# Patient Record
Sex: MALE | Race: WHITE | NOT HISPANIC OR LATINO | Employment: OTHER | URBAN - METROPOLITAN AREA
[De-identification: names, ages, dates, MRNs, and addresses within clinical notes are randomized per-mention and may not be internally consistent; named-entity substitution may affect disease eponyms.]

---

## 2020-01-31 ENCOUNTER — DOCUMENTATION ONLY (OUTPATIENT)
Dept: UROLOGY | Facility: CLINIC | Age: 75
End: 2020-01-31

## 2020-01-31 ENCOUNTER — TELEPHONE (OUTPATIENT)
Dept: UROLOGY | Facility: CLINIC | Age: 75
End: 2020-01-31

## 2020-01-31 ENCOUNTER — OFFICE VISIT (OUTPATIENT)
Dept: UROLOGY | Facility: CLINIC | Age: 75
End: 2020-01-31
Payer: MEDICARE

## 2020-01-31 VITALS
DIASTOLIC BLOOD PRESSURE: 68 MMHG | WEIGHT: 214 LBS | BODY MASS INDEX: 29.96 KG/M2 | HEIGHT: 71 IN | RESPIRATION RATE: 19 BRPM | HEART RATE: 68 BPM | SYSTOLIC BLOOD PRESSURE: 119 MMHG

## 2020-01-31 DIAGNOSIS — N52.9 ERECTILE DYSFUNCTION, UNSPECIFIED ERECTILE DYSFUNCTION TYPE: ICD-10-CM

## 2020-01-31 DIAGNOSIS — R39.15 URINARY URGENCY: ICD-10-CM

## 2020-01-31 DIAGNOSIS — N40.0 BPH WITHOUT URINARY OBSTRUCTION: Primary | ICD-10-CM

## 2020-01-31 DIAGNOSIS — R35.1 NOCTURIA: ICD-10-CM

## 2020-01-31 DIAGNOSIS — R35.0 URINARY FREQUENCY: ICD-10-CM

## 2020-01-31 LAB
POC RESIDUAL URINE VOLUME: 65 ML (ref 0–100)
PSA, DIAGNOSTIC: 0.93 NG/ML (ref 0.1–4)

## 2020-01-31 PROCEDURE — 99214 OFFICE O/P EST MOD 30 MIN: CPT | Mod: 25,S$GLB,, | Performed by: NURSE PRACTITIONER

## 2020-01-31 PROCEDURE — 51798 POCT BLADDER SCAN: ICD-10-PCS | Mod: S$GLB,,, | Performed by: NURSE PRACTITIONER

## 2020-01-31 PROCEDURE — 99214 PR OFFICE/OUTPT VISIT, EST, LEVL IV, 30-39 MIN: ICD-10-PCS | Mod: 25,S$GLB,, | Performed by: NURSE PRACTITIONER

## 2020-01-31 PROCEDURE — 51798 US URINE CAPACITY MEASURE: CPT | Mod: S$GLB,,, | Performed by: NURSE PRACTITIONER

## 2020-01-31 RX ORDER — NIACIN 1000 MG/1
TABLET, EXTENDED RELEASE ORAL
COMMUNITY
Start: 2020-01-29

## 2020-01-31 RX ORDER — CARVEDILOL 6.25 MG/1
6.25 TABLET ORAL 2 TIMES DAILY
COMMUNITY
Start: 2020-01-02

## 2020-01-31 RX ORDER — CALCIUM CITRATE/VITAMIN D3 200MG-6.25
TABLET ORAL
COMMUNITY
Start: 2019-11-23

## 2020-01-31 RX ORDER — SIMVASTATIN 40 MG/1
40 TABLET, FILM COATED ORAL DAILY
COMMUNITY
Start: 2019-11-04

## 2020-01-31 RX ORDER — PEN NEEDLE, DIABETIC 30 GX3/16"
NEEDLE, DISPOSABLE MISCELLANEOUS
COMMUNITY
Start: 2020-01-02

## 2020-01-31 RX ORDER — AMLODIPINE BESYLATE 5 MG/1
5 TABLET ORAL EVERY MORNING
COMMUNITY
Start: 2019-11-01

## 2020-01-31 RX ORDER — FUROSEMIDE 40 MG/1
20 TABLET ORAL
COMMUNITY
Start: 2020-01-20

## 2020-01-31 RX ORDER — POLYETHYLENE GLYCOL-3350 AND ELECTROLYTES 236; 6.74; 5.86; 2.97; 22.74 G/274.31G; G/274.31G; G/274.31G; G/274.31G; G/274.31G
POWDER, FOR SOLUTION ORAL
COMMUNITY
Start: 2020-01-28

## 2020-01-31 RX ORDER — RIVAROXABAN 20 MG/1
TABLET, FILM COATED ORAL
COMMUNITY
Start: 2020-01-21

## 2020-01-31 RX ORDER — LISINOPRIL 20 MG/1
20 TABLET ORAL 2 TIMES DAILY
COMMUNITY
Start: 2019-11-14

## 2020-01-31 RX ORDER — INSULIN GLARGINE AND LIXISENATIDE 100; 33 U/ML; UG/ML
INJECTION, SOLUTION SUBCUTANEOUS
COMMUNITY
Start: 2020-01-23

## 2020-01-31 NOTE — PROGRESS NOTES
"Subjective:       Patient ID: Rajendra Paiz is a 74 y.o. male.    Chief Complaint: Other (Yearly f/u)      HPI: 74-year-old male, patient Dr. Aiken, presents for yearly re-evaluation.  Patient has a history BPH without obstruction.  Patient denies any pain or burning urination.  States he has a good stream.  Denies difficulty voiding.  Patient does complain of urinary frequency, urgency, and nocturia.  However, patient is on Lasix in the morning and patient also admits he drinks a lot a water up until bedtime.    Patient has a history of erectile dysfunction.  Patient had been on oral medications in the past, however they stopped working.  Patient also was on Tri Mix injections in the past which in no longer work either.  Patient has been provided information on penile prosthesis, but at this time patient has opted not to have a prosthesis.    No other urinary complaints.  All other health problems are stable at this time.       Past Medical History:   Past Medical History:   Diagnosis Date    Diabetes mellitus     Elevated PSA     Hypercholesterolemia     Hyperlipidemia     Hypertension     Kidney stone        Past Surgical Historical:   Past Surgical History:   Procedure Laterality Date    arm surgery      BACK SURGERY      KIDNEY STONE SURGERY      trigger finger surgey      VARICOSE VEIN SURGERY      wirst surgery          Medications:   Medication List with Changes/Refills   Current Medications    AMLODIPINE (NORVASC) 5 MG TABLET    Take 5 mg by mouth every morning.    CARVEDILOL (COREG) 6.25 MG TABLET    Take 6.25 mg by mouth 2 (two) times daily.    FUROSEMIDE (LASIX) 40 MG TABLET        GAVILYTE-G 236-22.74-6.74 -5.86 GRAM SUSPENSION        LISINOPRIL (PRINIVIL,ZESTRIL) 20 MG TABLET    Take 20 mg by mouth 2 (two) times daily.    NIACIN (NIASPAN) 1000 MG CR TABLET        PEN NEEDLE, DIABETIC 32 GAUGE X 5/32" NDLE    USE 1 PEN NEEDLE ONCE DAILY    SIMVASTATIN (ZOCOR) 40 MG TABLET    Take 40 mg by " mouth once daily.    SOLIQUA 100/33 100 UNIT-33 MCG/ML INPN        TRUE METRIX GLUCOSE TEST STRIP STRP    CHECK BLOOD SUGAR ONCE DAILY    XARELTO 20 MG TAB            Past Social History:   Social History     Socioeconomic History    Marital status:      Spouse name: Not on file    Number of children: Not on file    Years of education: Not on file    Highest education level: Not on file   Occupational History    Not on file   Social Needs    Financial resource strain: Not on file    Food insecurity:     Worry: Not on file     Inability: Not on file    Transportation needs:     Medical: Not on file     Non-medical: Not on file   Tobacco Use    Smoking status: Never Smoker    Smokeless tobacco: Never Used   Substance and Sexual Activity    Alcohol use: Never     Frequency: Never    Drug use: Never    Sexual activity: Not on file   Lifestyle    Physical activity:     Days per week: Not on file     Minutes per session: Not on file    Stress: Not on file   Relationships    Social connections:     Talks on phone: Not on file     Gets together: Not on file     Attends Anabaptist service: Not on file     Active member of club or organization: Not on file     Attends meetings of clubs or organizations: Not on file     Relationship status: Not on file   Other Topics Concern    Not on file   Social History Narrative    Not on file       Allergies: Review of patient's allergies indicates:  No Known Allergies     Family History: History reviewed. No pertinent family history.     Review of Systems:  Review of Systems   Constitutional: Negative for activity change and appetite change.   HENT: Negative for congestion and dental problem.    Eyes: Negative for visual disturbance.   Respiratory: Negative for chest tightness and shortness of breath.    Cardiovascular: Negative for chest pain.   Gastrointestinal: Negative for abdominal distention and abdominal pain.   Genitourinary: Positive for frequency and  urgency. Negative for decreased urine volume, difficulty urinating, discharge, dysuria, enuresis, flank pain, genital sores, hematuria, penile pain, penile swelling, scrotal swelling and testicular pain.   Musculoskeletal: Negative for back pain and neck pain.   Skin: Negative for color change.   Neurological: Negative for dizziness.   Hematological: Negative for adenopathy.   Psychiatric/Behavioral: Negative for agitation, behavioral problems and confusion.       Physical Exam:  Physical Exam   Nursing note and vitals reviewed.  Constitutional: He is oriented to person, place, and time. He appears well-developed and well-nourished.   HENT:   Head: Normocephalic.   Eyes: Pupils are equal, round, and reactive to light.   Neck: Normal range of motion. Neck supple.   Cardiovascular: Normal rate, regular rhythm and normal heart sounds.    Pulmonary/Chest: Effort normal and breath sounds normal.   Abdominal: Soft. Bowel sounds are normal.   Genitourinary: Rectum normal and prostate normal.   Musculoskeletal: Normal range of motion.   Neurological: He is alert and oriented to person, place, and time.   Skin: Skin is warm and dry.     Psychiatric: He has a normal mood and affect. His behavior is normal.     Bladder scan:  65 cc.    Assessment/Plan:   1.  BPH without obstruction:  Will check the patient's PSA.  We will notify him of the results.    2.  Erectile dysfunction:  We discussed penile prosthesis again.  Patient still has considered it, however at this time still does not want to proceed.  Patient instructed to notify us if he would like to proceed with a penile prosthesis.    3.  Urinary frequency/urgency/nocturia:  Patient is frequency and urgency that is primarily.  In the morning after taking his fluid pill.  We discussed the association and patient stated understanding.  We discussed nightly fluids, and and affects of tea, soda, caffeine, and he salt/sodium intake.  Patient stated understanding.    Patient  will follow up in 1 year, sooner if needed.  Problem List Items Addressed This Visit     None      Visit Diagnoses     BPH without urinary obstruction    -  Primary    Relevant Orders    Prostate Specific Antigen, Diagnostic    POCT Bladder Scan    Erectile dysfunction, unspecified erectile dysfunction type        Urinary urgency        Urinary frequency        Nocturia

## 2021-04-19 ENCOUNTER — OFFICE VISIT (OUTPATIENT)
Dept: UROLOGY | Facility: CLINIC | Age: 76
End: 2021-04-19
Payer: MEDICARE

## 2021-04-19 VITALS — WEIGHT: 214 LBS | RESPIRATION RATE: 18 BRPM | BODY MASS INDEX: 30.64 KG/M2 | HEIGHT: 70 IN

## 2021-04-19 DIAGNOSIS — N40.0 BPH WITHOUT URINARY OBSTRUCTION: Primary | ICD-10-CM

## 2021-04-19 LAB — PSA, DIAGNOSTIC: 0.82 NG/ML (ref 0–4)

## 2021-04-19 PROCEDURE — 51798 US URINE CAPACITY MEASURE: CPT | Mod: S$GLB,,, | Performed by: NURSE PRACTITIONER

## 2021-04-19 PROCEDURE — 51798 POCT BLADDER SCAN: ICD-10-PCS | Mod: S$GLB,,, | Performed by: NURSE PRACTITIONER

## 2021-04-19 PROCEDURE — 99214 PR OFFICE/OUTPT VISIT, EST, LEVL IV, 30-39 MIN: ICD-10-PCS | Mod: S$GLB,,, | Performed by: NURSE PRACTITIONER

## 2021-04-19 PROCEDURE — 99214 OFFICE O/P EST MOD 30 MIN: CPT | Mod: S$GLB,,, | Performed by: NURSE PRACTITIONER

## 2021-04-19 RX ORDER — TADALAFIL 20 MG/1
20 TABLET ORAL DAILY
Qty: 5 TABLET | Refills: 11 | Status: SHIPPED | OUTPATIENT
Start: 2021-04-19 | End: 2022-03-15

## 2021-04-19 RX ORDER — ASPIRIN 81 MG/1
81 TABLET ORAL DAILY
COMMUNITY

## 2021-04-20 ENCOUNTER — TELEPHONE (OUTPATIENT)
Dept: UROLOGY | Facility: CLINIC | Age: 76
End: 2021-04-20

## 2021-04-20 LAB — POC RESIDUAL URINE VOLUME: 0 ML (ref 0–100)

## 2022-04-19 ENCOUNTER — TELEPHONE (OUTPATIENT)
Dept: UROLOGY | Facility: CLINIC | Age: 77
End: 2022-04-19

## 2022-04-19 ENCOUNTER — OFFICE VISIT (OUTPATIENT)
Dept: UROLOGY | Facility: CLINIC | Age: 77
End: 2022-04-19
Payer: MEDICARE

## 2022-04-19 VITALS
BODY MASS INDEX: 29.78 KG/M2 | WEIGHT: 208 LBS | HEART RATE: 59 BPM | DIASTOLIC BLOOD PRESSURE: 75 MMHG | HEIGHT: 70 IN | SYSTOLIC BLOOD PRESSURE: 149 MMHG

## 2022-04-19 DIAGNOSIS — N40.0 BPH WITHOUT URINARY OBSTRUCTION: Primary | ICD-10-CM

## 2022-04-19 DIAGNOSIS — N52.9 ERECTILE DYSFUNCTION, UNSPECIFIED ERECTILE DYSFUNCTION TYPE: ICD-10-CM

## 2022-04-19 LAB — PSA, DIAGNOSTIC: 0.68 NG/ML (ref 0–4)

## 2022-04-19 PROCEDURE — 99214 PR OFFICE/OUTPT VISIT, EST, LEVL IV, 30-39 MIN: ICD-10-PCS | Mod: S$GLB,,, | Performed by: NURSE PRACTITIONER

## 2022-04-19 PROCEDURE — 99214 OFFICE O/P EST MOD 30 MIN: CPT | Mod: S$GLB,,, | Performed by: NURSE PRACTITIONER

## 2022-04-19 RX ORDER — ALBUTEROL SULFATE 0.83 MG/ML
SOLUTION RESPIRATORY (INHALATION)
COMMUNITY
Start: 2022-04-14

## 2022-04-19 RX ORDER — FERROUS GLUCONATE 324(38)MG
1 TABLET ORAL 2 TIMES DAILY
COMMUNITY
Start: 2022-03-26

## 2022-04-19 RX ORDER — TADALAFIL 20 MG/1
20 TABLET ORAL DAILY
Qty: 5 TABLET | Refills: 11 | Status: SHIPPED | OUTPATIENT
Start: 2022-04-19 | End: 2022-11-23

## 2022-04-19 RX ORDER — LORATADINE 10 MG/1
10 TABLET ORAL DAILY
COMMUNITY

## 2022-04-19 RX ORDER — METFORMIN HYDROCHLORIDE 500 MG/1
500 TABLET ORAL 3 TIMES DAILY
COMMUNITY
Start: 2022-03-29

## 2022-04-19 NOTE — TELEPHONE ENCOUNTER
Patient notified of results. [facility] LPN    ----- Message from Tian Heller NP sent at 4/19/2022  1:18 PM CDT -----  The PSA is good

## 2022-04-19 NOTE — PROGRESS NOTES
Subjective:       Patient ID: Rajendra Paiz is a 77 y.o. male.    Chief Complaint: Benign Prostatic Hypertrophy (Yrly/denies any urinary issues)      HPI: 77-year-old male, former patient Dr. Aiken, presents for yearly visit.  Patient has history BPH without obstruction.  Patient states he is doing well.  He denies any pain or burning urination.  States he has a pretty good stream from start to finish.  Denies having strain to void.  Denies any significant frequency urgency.  Does have occasional nocturia.  Denies any odor urine.  Denies any fever.  Denies any body aches.  Denies any blood in urine.  Denies any significant weight loss.    Patient has a history of erectile dysfunction.  Patient is currently on generic Cialis 20 mg as needed.  Patient states this works well for him for the most part.  He is requesting refills.    No other urinary complaints at this time.       Past Medical History:   Past Medical History:   Diagnosis Date    A-fib     Allergy     BPH without urinary obstruction     Diabetes mellitus     Hypercholesterolemia     Hyperlipidemia     Hypertension     Kidney stone        Past Surgical Historical:   Past Surgical History:   Procedure Laterality Date    arm surgery      BACK SURGERY      KIDNEY STONE SURGERY      trigger finger surgey      VARICOSE VEIN SURGERY      wirst surgery          Medications:   Medication List with Changes/Refills   Current Medications    ALBUTEROL (PROVENTIL) 2.5 MG /3 ML (0.083 %) NEBULIZER SOLUTION    USE 1 VIAL IN NEBULIZER 4 TIMES DAILY FOR WHEEZING FOR SHORTNESS OF BREATH    AMLODIPINE (NORVASC) 5 MG TABLET    Take 5 mg by mouth every morning.    ASPIRIN (ECOTRIN) 81 MG EC TABLET    Take 81 mg by mouth once daily.    CARVEDILOL (COREG) 6.25 MG TABLET    Take 6.25 mg by mouth 2 (two) times daily.    FERROUS GLUCONATE (FERGON) 324 MG TABLET    Take 1 tablet by mouth 2 (two) times daily.    FUROSEMIDE (LASIX) 40 MG TABLET    20 mg.     GAVILYTE-G  "236-22.74-6.74 -5.86 GRAM SUSPENSION        LISINOPRIL (PRINIVIL,ZESTRIL) 20 MG TABLET    Take 20 mg by mouth 2 (two) times daily.    LORATADINE (CLARITIN) 10 MG TABLET    Take 10 mg by mouth once daily.    METFORMIN (GLUCOPHAGE) 500 MG TABLET    Take 500 mg by mouth 3 (three) times daily.    NIACIN (NIASPAN) 1000 MG CR TABLET        PEN NEEDLE, DIABETIC 32 GAUGE X 5/32" NDLE    USE 1 PEN NEEDLE ONCE DAILY    SIMVASTATIN (ZOCOR) 40 MG TABLET    Take 40 mg by mouth once daily.    SOLIQUA 100/33 100 UNIT-33 MCG/ML INPN        TRUE METRIX GLUCOSE TEST STRIP STRP    CHECK BLOOD SUGAR ONCE DAILY    XARELTO 20 MG TAB       Changed and/or Refilled Medications    Modified Medication Previous Medication    TADALAFIL (CIALIS) 20 MG TAB tadalafiL (CIALIS) 20 MG Tab       Take 1 tablet (20 mg total) by mouth once daily.    Take 1 tablet by mouth once daily        Past Social History:   Social History     Socioeconomic History    Marital status:    Tobacco Use    Smoking status: Never Smoker    Smokeless tobacco: Never Used   Substance and Sexual Activity    Alcohol use: Never    Drug use: Never       Allergies:   Review of patient's allergies indicates:   Allergen Reactions    Procaine Other (See Comments)     Novocaine: hiccups severely          Family History:   Family History   Problem Relation Age of Onset    No Known Problems Father     No Known Problems Mother         Review of Systems:  Review of Systems   Constitutional: Negative for activity change and appetite change.   HENT: Negative for congestion and dental problem.    Eyes: Negative for visual disturbance.   Respiratory: Negative for chest tightness and shortness of breath.    Cardiovascular: Negative for chest pain.   Gastrointestinal: Negative for abdominal distention and abdominal pain.   Genitourinary: Negative for decreased urine volume, difficulty urinating, dysuria, enuresis, flank pain, frequency, genital sores, hematuria, penile discharge, " penile pain, penile swelling, scrotal swelling, testicular pain and urgency.   Musculoskeletal: Negative for back pain and neck pain.   Skin: Negative for color change.   Neurological: Negative for dizziness.   Hematological: Negative for adenopathy.   Psychiatric/Behavioral: Negative for agitation, behavioral problems and confusion.       Physical Exam:  Physical Exam  Vitals and nursing note reviewed.   Constitutional:       Appearance: He is well-developed.   HENT:      Head: Normocephalic.   Eyes:      Pupils: Pupils are equal, round, and reactive to light.   Cardiovascular:      Rate and Rhythm: Normal rate and regular rhythm.      Heart sounds: Normal heart sounds.   Pulmonary:      Effort: Pulmonary effort is normal.      Breath sounds: Normal breath sounds.   Abdominal:      General: Bowel sounds are normal.      Palpations: Abdomen is soft.   Genitourinary:     Penis: Normal.       Prostate: Normal.      Rectum: Normal.      Comments: Prostate exam is benign.  Prostate smooth with no nodules and nontender.  Prostate is symmetrical.  Musculoskeletal:         General: Normal range of motion.      Cervical back: Normal range of motion and neck supple.   Skin:     General: Skin is warm and dry.   Neurological:      Mental Status: He is alert and oriented to person, place, and time.   Psychiatric:         Behavior: Behavior normal.       Urinalysis:  Normal  Bladder scan:  79 cc    Assessment/Plan:   1. BPH without obstruction:  Check the patient's PSA.  We will notify him of the results via the ale.    2. Erectile dysfunction:  Patient request refill generic Cialis 20 mg.  Patient provided refill.    Will plan follow-up in 1 year, sooner if needed.  Problem List Items Addressed This Visit    None     Visit Diagnoses     BPH without urinary obstruction    -  Primary    Relevant Orders    Prostate Specific Antigen, Diagnostic    POCT Urinalysis (w/Micro Option)    Erectile dysfunction, unspecified erectile  dysfunction type        Relevant Medications    tadalafiL (CIALIS) 20 MG Tab

## 2023-04-19 ENCOUNTER — TELEPHONE (OUTPATIENT)
Dept: UROLOGY | Facility: CLINIC | Age: 78
End: 2023-04-19

## 2023-04-19 ENCOUNTER — OFFICE VISIT (OUTPATIENT)
Dept: UROLOGY | Facility: CLINIC | Age: 78
End: 2023-04-19
Payer: MEDICARE

## 2023-04-19 VITALS — HEART RATE: 85 BPM | DIASTOLIC BLOOD PRESSURE: 75 MMHG | SYSTOLIC BLOOD PRESSURE: 148 MMHG

## 2023-04-19 DIAGNOSIS — N52.9 ERECTILE DYSFUNCTION, UNSPECIFIED ERECTILE DYSFUNCTION TYPE: ICD-10-CM

## 2023-04-19 DIAGNOSIS — N40.0 BPH WITHOUT URINARY OBSTRUCTION: Primary | ICD-10-CM

## 2023-04-19 LAB — PSA, DIAGNOSTIC: 0.77 NG/ML (ref 0–4)

## 2023-04-19 PROCEDURE — 99214 OFFICE O/P EST MOD 30 MIN: CPT | Mod: S$GLB,,, | Performed by: NURSE PRACTITIONER

## 2023-04-19 PROCEDURE — 99214 PR OFFICE/OUTPT VISIT, EST, LEVL IV, 30-39 MIN: ICD-10-PCS | Mod: S$GLB,,, | Performed by: NURSE PRACTITIONER

## 2023-04-19 NOTE — PROGRESS NOTES
Subjective:       Patient ID: Rajendra Paiz is a 78 y.o. male.    Chief Complaint: Benign Prostatic Hypertrophy      HPI: 78-YEAR-OLD MALE, ESTABLISHED PATIENT, PRESENTS FOR YEARLY VISIT.    PATIENT HAS HISTORY OF BPH WITHOUT OBSTRUCTION.    Patient is not on any medication it is.    States he has a good stream.  Denies any pain or burning urination.  Denies having strain to void.  Denies any significant frequency, urgency, or nocturia.    Patient has history of erectile dysfunction.  We have the patient on tadalafil 20 mg as needed.    Patient states he is not on that medication right now.  States his cardiologist put him on medication and he was told not to take the tadalafil.      No other urinary complaints at this time.       Past Medical History:   Past Medical History:   Diagnosis Date    A-fib     Allergy     BPH without urinary obstruction     Diabetes mellitus     Hypercholesterolemia     Hyperlipidemia     Hypertension     Kidney stone        Past Surgical Historical:   Past Surgical History:   Procedure Laterality Date    arm surgery      BACK SURGERY      KIDNEY STONE SURGERY      trigger finger surgey      VARICOSE VEIN SURGERY      wirst surgery          Medications:   Medication List with Changes/Refills   Current Medications    ALBUTEROL (PROVENTIL) 2.5 MG /3 ML (0.083 %) NEBULIZER SOLUTION    USE 1 VIAL IN NEBULIZER 4 TIMES DAILY FOR WHEEZING FOR SHORTNESS OF BREATH    AMLODIPINE (NORVASC) 5 MG TABLET    Take 5 mg by mouth every morning.    ASPIRIN (ECOTRIN) 81 MG EC TABLET    Take 81 mg by mouth once daily.    CARVEDILOL (COREG) 6.25 MG TABLET    Take 6.25 mg by mouth 2 (two) times daily.    FERROUS GLUCONATE (FERGON) 324 MG TABLET    Take 1 tablet by mouth 2 (two) times daily.    FUROSEMIDE (LASIX) 40 MG TABLET    20 mg.     GAVILYTE-G 236-22.74-6.74 -5.86 GRAM SUSPENSION        LISINOPRIL (PRINIVIL,ZESTRIL) 20 MG TABLET    Take 20 mg by mouth 2 (two) times daily.    LORATADINE (CLARITIN) 10 MG  "TABLET    Take 10 mg by mouth once daily.    METFORMIN (GLUCOPHAGE) 500 MG TABLET    Take 500 mg by mouth 3 (three) times daily.    NIACIN (NIASPAN) 1000 MG CR TABLET        PEN NEEDLE, DIABETIC 32 GAUGE X 5/32" NDLE    USE 1 PEN NEEDLE ONCE DAILY    SIMVASTATIN (ZOCOR) 40 MG TABLET    Take 40 mg by mouth once daily.    SOLIQUA 100/33 100 UNIT-33 MCG/ML INPN        TADALAFIL (CIALIS) 20 MG TAB    Take 1 tablet (20 mg total) by mouth daily as needed (na).    TRUE METRIX GLUCOSE TEST STRIP STRP    CHECK BLOOD SUGAR ONCE DAILY    XARELTO 20 MG TAB            Past Social History:   Social History     Socioeconomic History    Marital status:    Tobacco Use    Smoking status: Never    Smokeless tobacco: Never   Substance and Sexual Activity    Alcohol use: Never    Drug use: Never       Allergies:   Review of patient's allergies indicates:   Allergen Reactions    Procaine Other (See Comments)     Novocaine: hiccups severely          Family History:   Family History   Problem Relation Age of Onset    No Known Problems Father     No Known Problems Mother         Review of Systems:  Review of Systems   Constitutional:  Negative for activity change and appetite change.   HENT:  Negative for congestion and dental problem.    Eyes:  Negative for visual disturbance.   Respiratory:  Negative for chest tightness and shortness of breath.    Cardiovascular:  Negative for chest pain.   Gastrointestinal:  Negative for abdominal distention and abdominal pain.   Genitourinary:  Negative for decreased urine volume, difficulty urinating, dysuria, enuresis, flank pain, frequency, genital sores, hematuria, penile discharge, penile pain, penile swelling, scrotal swelling, testicular pain and urgency.   Musculoskeletal:  Negative for back pain and neck pain.   Skin:  Negative for color change.   Neurological:  Negative for dizziness.   Hematological:  Negative for adenopathy.   Psychiatric/Behavioral:  Negative for agitation, " behavioral problems and confusion.      Physical Exam:  Physical Exam  Vitals and nursing note reviewed.   Constitutional:       Appearance: He is well-developed.   HENT:      Head: Normocephalic.   Eyes:      Pupils: Pupils are equal, round, and reactive to light.   Cardiovascular:      Rate and Rhythm: Normal rate and regular rhythm.      Heart sounds: Normal heart sounds.   Pulmonary:      Effort: Pulmonary effort is normal.      Breath sounds: Normal breath sounds.   Abdominal:      General: Bowel sounds are normal.      Palpations: Abdomen is soft.   Genitourinary:     Penis: Normal.       Prostate: Not enlarged (Prostate exam is benign.  Prostate smooth no nodules and nontender.  Prostate is symmetrical).      Rectum: Normal.   Musculoskeletal:         General: Normal range of motion.      Cervical back: Normal range of motion and neck supple.   Skin:     General: Skin is warm and dry.   Neurological:      Mental Status: He is alert and oriented to person, place, and time.   Psychiatric:         Behavior: Behavior normal.     Bladder scan:  15 cc    Assessment/Plan:   1. BPH without obstruction:  Check patient's PSA.  We will notify him of the results.      2. Erectile dysfunction:  Patient stop tadalafil per instructions from cardiologist.      Plan follow-up in 1 year, sooner if needed  Problem List Items Addressed This Visit    None  Visit Diagnoses       BPH without urinary obstruction    -  Primary    Relevant Orders    Prostate Specific Antigen, Diagnostic    POCT Bladder Scan    Erectile dysfunction, unspecified erectile dysfunction type

## 2024-04-22 ENCOUNTER — OFFICE VISIT (OUTPATIENT)
Dept: UROLOGY | Facility: CLINIC | Age: 79
End: 2024-04-22
Payer: MEDICARE

## 2024-04-22 VITALS
HEART RATE: 78 BPM | HEIGHT: 70 IN | OXYGEN SATURATION: 97 % | SYSTOLIC BLOOD PRESSURE: 138 MMHG | WEIGHT: 200 LBS | BODY MASS INDEX: 28.63 KG/M2 | DIASTOLIC BLOOD PRESSURE: 76 MMHG

## 2024-04-22 DIAGNOSIS — N40.0 BPH WITHOUT URINARY OBSTRUCTION: Primary | ICD-10-CM

## 2024-04-22 LAB — PSA, DIAGNOSTIC: 0.75 NG/ML (ref 0.1–4)

## 2024-04-22 PROCEDURE — 99213 OFFICE O/P EST LOW 20 MIN: CPT | Mod: S$GLB,,, | Performed by: NURSE PRACTITIONER

## 2024-04-22 PROCEDURE — 36415 COLL VENOUS BLD VENIPUNCTURE: CPT | Mod: S$GLB,,, | Performed by: NURSE PRACTITIONER

## 2024-04-22 NOTE — PROGRESS NOTES
"Subjective:       Patient ID: Rajendra Paiz is a 79 y.o. male.    Chief Complaint: Benign Prostatic Hypertrophy (Without urinary obstruction)      HPI: 79-year-old male, established patient, presents for yearly visit.    Patient has history BPH without obstruction.    .  Denies any pain or burning urination.  Denies any difficulty voiding.  States he has a good stream from start to finish.  Denies any significant frequency, urgency, or nocturia.  No other urinary complaints this time.         Past Medical History:   Past Medical History:   Diagnosis Date    A-fib     Allergy     BPH without urinary obstruction     Diabetes mellitus     Hypercholesterolemia     Hyperlipidemia     Hypertension     Kidney stone        Past Surgical Historical:   Past Surgical History:   Procedure Laterality Date    arm surgery      BACK SURGERY      KIDNEY STONE SURGERY      trigger finger surgey      VARICOSE VEIN SURGERY      wirst surgery          Medications:   Medication List with Changes/Refills   Current Medications    ALBUTEROL (PROVENTIL) 2.5 MG /3 ML (0.083 %) NEBULIZER SOLUTION    USE 1 VIAL IN NEBULIZER 4 TIMES DAILY FOR WHEEZING FOR SHORTNESS OF BREATH    AMLODIPINE (NORVASC) 5 MG TABLET    Take 5 mg by mouth every morning.    ASPIRIN (ECOTRIN) 81 MG EC TABLET    Take 81 mg by mouth once daily.    CARVEDILOL (COREG) 6.25 MG TABLET    Take 6.25 mg by mouth 2 (two) times daily.    FERROUS GLUCONATE (FERGON) 324 MG TABLET    Take 1 tablet by mouth 2 (two) times daily.    FUROSEMIDE (LASIX) 40 MG TABLET    20 mg.     GAVILYTE-G 236-22.74-6.74 -5.86 GRAM SUSPENSION        LISINOPRIL (PRINIVIL,ZESTRIL) 20 MG TABLET    Take 20 mg by mouth 2 (two) times daily.    LORATADINE (CLARITIN) 10 MG TABLET    Take 10 mg by mouth once daily.    METFORMIN (GLUCOPHAGE) 500 MG TABLET    Take 500 mg by mouth 3 (three) times daily.    NIACIN (NIASPAN) 1000 MG CR TABLET        PEN NEEDLE, DIABETIC 32 GAUGE X 5/32" NDLE    USE 1 PEN NEEDLE ONCE " DAILY    SIMVASTATIN (ZOCOR) 40 MG TABLET    Take 40 mg by mouth once daily.    SOLIQUA 100/33 100 UNIT-33 MCG/ML INPN        TADALAFIL (CIALIS) 20 MG TAB    Take 1 tablet (20 mg total) by mouth daily as needed (na).    TRUE METRIX GLUCOSE TEST STRIP STRP    CHECK BLOOD SUGAR ONCE DAILY    XARELTO 20 MG TAB            Past Social History:   Social History     Socioeconomic History    Marital status:    Tobacco Use    Smoking status: Never    Smokeless tobacco: Never   Substance and Sexual Activity    Alcohol use: Never    Drug use: Never       Allergies:   Review of patient's allergies indicates:   Allergen Reactions    Procaine Other (See Comments)     Novocaine: hiccups severely          Family History:   Family History   Problem Relation Name Age of Onset    No Known Problems Father      No Known Problems Mother          Review of Systems:  Review of Systems   Constitutional:  Negative for activity change and appetite change.   HENT:  Negative for congestion and dental problem.    Respiratory:  Negative for chest tightness and shortness of breath.    Cardiovascular:  Negative for chest pain.   Gastrointestinal:  Negative for abdominal distention and abdominal pain.   Genitourinary:  Negative for decreased urine volume, difficulty urinating, dysuria, enuresis, flank pain, frequency, genital sores, hematuria, penile discharge, penile pain, penile swelling, scrotal swelling, testicular pain and urgency.   Musculoskeletal:  Negative for back pain and neck pain.   Neurological:  Negative for dizziness.   Hematological:  Negative for adenopathy.   Psychiatric/Behavioral:  Negative for agitation, behavioral problems and confusion.        Physical Exam:  Physical Exam  Vitals and nursing note reviewed.   Constitutional:       Appearance: He is well-developed.   HENT:      Head: Normocephalic.   Cardiovascular:      Rate and Rhythm: Normal rate and regular rhythm.      Heart sounds: Normal heart sounds.    Pulmonary:      Effort: Pulmonary effort is normal.      Breath sounds: Normal breath sounds.   Abdominal:      General: Bowel sounds are normal.      Palpations: Abdomen is soft.   Genitourinary:     Prostate: Normal.      Rectum: Normal.      Comments: Prostate exam is benign.  Prostate smooth with no nodules and nontender.  Prostate is symmetrical.  Skin:     General: Skin is warm and dry.   Neurological:      Mental Status: He is alert and oriented to person, place, and time.       Bladder scan:  0 cc    Assessment/Plan:   1. BPH without obstruction:  Check the patient's PSA.  We will notify him of the results.    Patient continues to do well with no urinary complaints.      Follow-up 1 year, sooner if needed.  Problem List Items Addressed This Visit    None  Visit Diagnoses       BPH without urinary obstruction    -  Primary    Relevant Orders    POCT Bladder Scan    Prostate Specific Antigen, Diagnostic

## 2024-04-29 ENCOUNTER — TELEPHONE (OUTPATIENT)
Dept: UROLOGY | Facility: CLINIC | Age: 79
End: 2024-04-29
Payer: MEDICARE

## 2025-01-30 ENCOUNTER — OUTSIDE PLACE OF SERVICE (OUTPATIENT)
Dept: SURGERY | Facility: CLINIC | Age: 80
End: 2025-01-30
Payer: MEDICARE

## 2025-01-30 PROCEDURE — 47562 LAPAROSCOPIC CHOLECYSTECTOMY: CPT | Mod: ,,, | Performed by: SURGERY

## 2025-04-22 ENCOUNTER — OFFICE VISIT (OUTPATIENT)
Dept: UROLOGY | Facility: CLINIC | Age: 80
End: 2025-04-22
Payer: MEDICARE

## 2025-04-22 VITALS
BODY MASS INDEX: 26.48 KG/M2 | HEIGHT: 70 IN | RESPIRATION RATE: 18 BRPM | HEART RATE: 84 BPM | SYSTOLIC BLOOD PRESSURE: 126 MMHG | WEIGHT: 185 LBS | OXYGEN SATURATION: 96 % | DIASTOLIC BLOOD PRESSURE: 64 MMHG

## 2025-04-22 DIAGNOSIS — N40.0 BPH WITHOUT URINARY OBSTRUCTION: Primary | ICD-10-CM

## 2025-04-22 LAB — PSA, DIAGNOSTIC: 0.91 NG/ML (ref 0–4)

## 2025-04-22 PROCEDURE — 99213 OFFICE O/P EST LOW 20 MIN: CPT | Mod: S$PBB,,, | Performed by: NURSE PRACTITIONER

## 2025-04-22 RX ORDER — SILDENAFIL CITRATE 20 MG/1
TABLET ORAL
COMMUNITY
Start: 2025-04-11

## 2025-04-22 RX ORDER — LEVOTHYROXINE SODIUM 75 UG/1
TABLET ORAL
COMMUNITY
Start: 2025-04-04

## 2025-04-22 NOTE — PROGRESS NOTES
SN used aseptic technique when collecting PSA lab draw from left AC using 22g needle, no bleeding and no complications noted. Patient tolerated procedures well without complications noted.

## 2025-04-22 NOTE — PROGRESS NOTES
Subjective:       Patient ID: Rajendra Paiz is a 80 y.o. male.    Chief Complaint: Follow-up      HPI: 80-year-old male, established patient, presents for yearly visit.  Patient has a history of BPH without obstruction.    Patient denies any pain or burning urination.  Denies any difficulty voiding.  States he has a good stream from start to finish.  Denies any significant frequency, urgency, or nocturia.  Denies any blood in the urine.  Denies any odor to the urine.  Denies any unexpected weight loss.  Denies any onset bone pain.    No other urinary complaints at this time.         Past Medical History:   Past Medical History:   Diagnosis Date    A-fib     Allergy     BPH without urinary obstruction     Diabetes mellitus     Hypercholesterolemia     Hyperlipidemia     Hypertension     Kidney stone        Past Surgical Historical:   Past Surgical History:   Procedure Laterality Date    arm surgery      BACK SURGERY      CHOLECYSTECTOMY      January 2025    KIDNEY STONE SURGERY      trigger finger surgey      VARICOSE VEIN SURGERY      wirst surgery          Medications:   Medication List with Changes/Refills   Current Medications    ALBUTEROL (PROVENTIL) 2.5 MG /3 ML (0.083 %) NEBULIZER SOLUTION    USE 1 VIAL IN NEBULIZER 4 TIMES DAILY FOR WHEEZING FOR SHORTNESS OF BREATH    AMLODIPINE (NORVASC) 5 MG TABLET    Take 5 mg by mouth every morning.    ASPIRIN (ECOTRIN) 81 MG EC TABLET    Take 81 mg by mouth once daily.    CARVEDILOL (COREG) 6.25 MG TABLET    Take 6.25 mg by mouth 2 (two) times daily.    FERROUS GLUCONATE (FERGON) 324 MG TABLET    Take 1 tablet by mouth 2 (two) times daily.    FUROSEMIDE (LASIX) 40 MG TABLET    20 mg.     GAVILYTE-G [id number] -5.86 GRAM SUSPENSION        LEVOTHYROXINE (SYNTHROID) 75 MCG TABLET    TAKE 1 TABLET BY MOUTH 30 MINUTES BEFORE BREAKFAST    LISINOPRIL (PRINIVIL,ZESTRIL) 20 MG TABLET    Take 20 mg by mouth 2 (two) times daily.    LORATADINE (CLARITIN) 10 MG TABLET    Take 10  "mg by mouth once daily.    METFORMIN (GLUCOPHAGE) 500 MG TABLET    Take 500 mg by mouth 3 (three) times daily.    NIACIN (NIASPAN) 1000 MG CR TABLET        PEN NEEDLE, DIABETIC 32 GAUGE X 5/32" NDLE    USE 1 PEN NEEDLE ONCE DAILY    SILDENAFIL (REVATIO) 20 MG TAB    TAKE 1 TABLET BY MOUTH TWICE DAILY FOR PULMONARY HYPERTENSION    SIMVASTATIN (ZOCOR) 40 MG TABLET    Take 40 mg by mouth once daily.    SOLIQUA 100/33 100 UNIT-33 MCG/ML INPN        TRUE METRIX GLUCOSE TEST STRIP STRP    CHECK BLOOD SUGAR ONCE DAILY    XARELTO 20 MG TAB       Discontinued Medications    TADALAFIL (CIALIS) 20 MG TAB    Take 1 tablet (20 mg total) by mouth daily as needed (na).        Past Social History: Social History[1]    Allergies:   Review of patient's allergies indicates:   Allergen Reactions    Procaine Other (See Comments)     Novocaine: hiccups severely          Family History:   Family History   Problem Relation Name Age of Onset    No Known Problems Father      No Known Problems Mother          Review of Systems:  Review of Systems   Constitutional:  Negative for activity change and appetite change.   HENT:  Negative for congestion and dental problem.    Respiratory:  Negative for chest tightness and shortness of breath.    Cardiovascular:  Negative for chest pain.   Gastrointestinal:  Negative for abdominal distention and abdominal pain.   Genitourinary:  Negative for decreased urine volume, difficulty urinating, dysuria, enuresis, flank pain, frequency, genital sores, hematuria, penile discharge, penile pain, penile swelling, scrotal swelling, testicular pain and urgency.   Musculoskeletal:  Negative for back pain and neck pain.   Neurological:  Negative for dizziness.   Hematological:  Negative for adenopathy.   Psychiatric/Behavioral:  Negative for agitation, behavioral problems and confusion.        Physical Exam:  Physical Exam  Vitals and nursing note reviewed.   Constitutional:       Appearance: He is well-developed. "   HENT:      Head: Normocephalic.   Cardiovascular:      Rate and Rhythm: Normal rate and regular rhythm.      Heart sounds: Normal heart sounds.   Pulmonary:      Effort: Pulmonary effort is normal.      Breath sounds: Normal breath sounds.   Abdominal:      General: Bowel sounds are normal.      Palpations: Abdomen is soft.   Skin:     General: Skin is warm and dry.   Neurological:      Mental Status: He is alert and oriented to person, place, and time.         Assessment/Plan:   BPH without obstruction: Patient is doing well no complaints.    Will check the patient's PSA.  We will notify him of the results.    Plan follow-up in 1 year, sooner if needed.  Problem List Items Addressed This Visit    None  Visit Diagnoses         BPH without urinary obstruction    -  Primary    Relevant Orders    Prostate Specific Antigen, Diagnostic                      [1]   Social History  Socioeconomic History    Marital status:    Tobacco Use    Smoking status: Never    Smokeless tobacco: Never   Substance and Sexual Activity    Alcohol use: Never    Drug use: Never

## 2025-04-23 ENCOUNTER — RESULTS FOLLOW-UP (OUTPATIENT)
Dept: UROLOGY | Facility: CLINIC | Age: 80
End: 2025-04-23

## 2025-04-23 ENCOUNTER — TELEPHONE (OUTPATIENT)
Dept: UROLOGY | Facility: CLINIC | Age: 80
End: 2025-04-23
Payer: MEDICARE

## 2025-04-23 NOTE — TELEPHONE ENCOUNTER
Called and left voicemail giving him his PSA result with no significant change from a year ago. Also to call the office with any questions or concerns----- Message from Tian Heller NP sent at 4/23/2025 10:31 AM CDT -----  PSA is in the normal range with no significant change from 1 year ago  ----- Message -----  From: Interface, Lab [facility]  Sent: 4/22/2025   5:00 PM CDT  To: Tian Heller NP